# Patient Record
Sex: FEMALE | Race: WHITE
[De-identification: names, ages, dates, MRNs, and addresses within clinical notes are randomized per-mention and may not be internally consistent; named-entity substitution may affect disease eponyms.]

---

## 2020-02-20 ENCOUNTER — HOSPITAL ENCOUNTER (EMERGENCY)
Dept: HOSPITAL 25 - UCEAST | Age: 20
Discharge: HOME | End: 2020-02-20
Payer: COMMERCIAL

## 2020-02-20 VITALS — DIASTOLIC BLOOD PRESSURE: 74 MMHG | SYSTOLIC BLOOD PRESSURE: 132 MMHG

## 2020-02-20 DIAGNOSIS — J45.909: ICD-10-CM

## 2020-02-20 DIAGNOSIS — M25.562: Primary | ICD-10-CM

## 2020-02-20 DIAGNOSIS — M25.462: ICD-10-CM

## 2020-02-20 PROCEDURE — 99203 OFFICE O/P NEW LOW 30 MIN: CPT

## 2020-02-20 PROCEDURE — G0463 HOSPITAL OUTPT CLINIC VISIT: HCPCS

## 2020-02-20 NOTE — UC
Knee Pain HPI





- HPI Summary


HPI Summary: 


19-year-old female comes in with a chief complaint of left knee pain.  She her 

knee skiing today.  When she tries to walk on it feels unstable and it makes 

clicking noises.  Pain is less when she rests it.  It is swollen.





- History of Current Complaint


Chief Complaint: UCLowerExtremity


Stated Complaint: LEFT KNEE INJURY


Time Seen by Provider: 02/20/20 21:27


Hx Last Menstrual Period: 2/13/20-2/18/20


Pain Intensity: 1





- Allergies/Home Medications


Allergies/Adverse Reactions: 


 Allergies











Allergy/AdvReac Type Severity Reaction Status Date / Time


 


No Known Allergies Allergy   Verified 02/20/20 21:27











Home Medications: 


 Home Medications





Albuterol HFA INHALER* [Ventolin HFA Inhaler*] 1 - 2 puff INH Q4H PRN 02/20/20 [

History Confirmed 02/20/20]











PMH/Surg Hx/FS Hx/Imm Hx


Previously Healthy: Yes


Respiratory History: Asthma





- Surgical History


Surgical History: None





- Family History


Known Family History: Positive: Non-Contributory





- Social History


Alcohol Use: Occasionally


Substance Use Type: None


Smoking Status (MU): Never Smoked Tobacco





Review of Systems


All Other Systems Reviewed And Are Negative: Yes


Constitutional: Positive: Negative


Skin: Positive: Negative


Eyes: Positive: Negative


ENT: Positive: Negative


Respiratory: Positive: Negative


Cardiovascular: Positive: Negative


Gastrointestinal: Positive: Negative


Motor: Positive: Other - SEE HPI


Neurovascular: Positive: Negative


Musculoskeletal: Positive: Other: - SEE HPI


Neurological/Mental Status: Positive: Negative


Psychological: Positive: Negative


Is Patient Immunocompromised?: No





Physical Exam


Triage Information Reviewed: Yes


Appearance: Well-Appearing, No Pain Distress, Well-Nourished


Vital Signs: 


 Initial Vital Signs











Temp  98.4 F   02/20/20 21:28


 


Pulse  83   02/20/20 21:28


 


Resp  16   02/20/20 21:28


 


BP  132/74   02/20/20 21:28


 


Pulse Ox  97   02/20/20 21:28











Vital Signs Reviewed: Yes


Eye Exam: Normal


Eyes: Positive: Conjunctiva Clear


Neck: Positive: Supple


Respiratory: Positive: No respiratory distress


Musculoskeletal: Positive: Other: - Left knee has an effusion.  Is mildly 

tender to palpation in the posterior aspect.  Patella is nontender to palpation 

lateral aspects are nontender.  It feels unstable to exam.  Erika was 

negative.


Neurological: Positive: Alert


Psychological: Positive: Age Appropriate Behavior


Skin Exam: Normal





Knee Pain Course/Dx





- Course


Course Of Treatment: 


I do not see any fracture on the x-rays.  Final radiologist reading is pending.

  Patient has an effusion with instability giving concern for ACL injury.  

Patient was placed in Ace wrap and knee immobilizer per nursing.  Clinic 

patient and her neurovascular intact after placement.  Patient given crutches 

she's can ice it is ibuprofen.  Patient is from [place] and she's going on 

break and she prefers to follow-up with her orthopedist in [place] this coming 

week.  Patient get reevaluated sooner if worse or any questions or concerns.





- Differential Dx/Diagnosis


Provider Diagnosis: 


 Left knee pain, Effusion, left knee








Discharge ED





- Sign-Out/Discharge


Documenting (check all that apply): Patient Departure


All imaging exams completed and their final reports reviewed: No





- Discharge Plan


Condition: Stable


Disposition: HOME


Patient Education Materials:  Swollen Knee Joint (ED)


Referrals: 


Cintia Fair MD [Medical Doctor] - 


[facility] [Provider Group]


Additional Instructions: 


FOLLOW UP WITH ORTHOPEDICS OR SPORTS MEDICINE. 


GET REEVALUATED SOONER IF NOT IMPROVED OR WORSE OR ANY QUESTIONS OR CONCERNS.





- Billing Disposition and Condition


Condition: STABLE


Disposition: Home

## 2020-02-21 NOTE — UC
- Progress Note


Progress Note: 





wet read correct





Course/Dx





- Diagnoses


Provider Diagnoses: 


 Left knee pain, Effusion, left knee








Discharge ED





- Sign-Out/Discharge


Documenting (check all that apply): Post-Discharge Follow Up


All imaging exams completed and their final reports reviewed: Yes





- Discharge Plan


Condition: Stable


Disposition: HOME


Patient Education Materials:  Swollen Knee Joint (ED)


Referrals: 


[facility] [Provider Group]


Cintia Fair MD [Medical Doctor] - 


Additional Instructions: 


FOLLOW UP WITH ORTHOPEDICS OR SPORTS MEDICINE. 


GET REEVALUATED SOONER IF NOT IMPROVED OR WORSE OR ANY QUESTIONS OR CONCERNS.





- Billing Disposition and Condition


Condition: STABLE


Disposition: Home